# Patient Record
Sex: FEMALE | Race: BLACK OR AFRICAN AMERICAN | NOT HISPANIC OR LATINO | URBAN - METROPOLITAN AREA
[De-identification: names, ages, dates, MRNs, and addresses within clinical notes are randomized per-mention and may not be internally consistent; named-entity substitution may affect disease eponyms.]

---

## 2023-04-09 ENCOUNTER — HOSPITAL ENCOUNTER (EMERGENCY)
Facility: OTHER | Age: 48
Discharge: HOME | End: 2023-04-09
Attending: EMERGENCY MEDICINE

## 2023-04-09 VITALS
WEIGHT: 160 LBS | TEMPERATURE: 99 F | HEART RATE: 79 BPM | DIASTOLIC BLOOD PRESSURE: 72 MMHG | BODY MASS INDEX: 25.11 KG/M2 | HEIGHT: 67 IN | OXYGEN SATURATION: 100 % | RESPIRATION RATE: 17 BRPM | SYSTOLIC BLOOD PRESSURE: 122 MMHG

## 2023-04-09 DIAGNOSIS — Z11.1 SCREENING-PULMONARY TB: ICD-10-CM

## 2023-04-09 DIAGNOSIS — Z11.1 NORMAL SCREENING CHEST X-RAY FOR TUBERCULOSIS: ICD-10-CM

## 2023-04-09 DIAGNOSIS — Z20.822 LAB TEST NEGATIVE FOR COVID-19 VIRUS: Primary | ICD-10-CM

## 2023-04-09 LAB
CTP QC/QA: YES
SARS-COV-2 RDRP RESP QL NAA+PROBE: NEGATIVE

## 2023-04-09 PROCEDURE — 99283 EMERGENCY DEPT VISIT LOW MDM: CPT | Mod: 25

## 2023-04-09 NOTE — Clinical Note
"Angeles Orourkesam Rolle was seen and treated in our emergency department on 4/9/2023.  She may return to work on 04/09/2023.       If you have any questions or concerns, please don't hesitate to call.      PEPE Emanuel"
23

## 2023-04-10 NOTE — ED NOTES
Pt has the results of her Covid test that was recently done earlier today but does not have the chest x-ray.

## 2023-04-10 NOTE — ED PROVIDER NOTES
"  Source of History:  Patient     Chief complaint:  SHELTER  (shelter clearance needed )      HPI:  Angeles Rolle is a 48 y.o. female presenting with  request for screening for shelter placement.  Patient was told she needs screening for tuberculosis.  States that she would negative COVID swab earlier today.  Denies any symptomology for either.      This is the extent to the patients complaints today here in the emergency department.    ROS: As per HPI and below:  Constitutional: No fever.  No chills.  Eyes: No visual changes.   ENT: No sore throat. No ear pain.  Urinary: No abnormal urination.  MSK: no arthralgias  Integument: No rashes or lesions.    Review of patient's allergies indicates:  No Known Allergies    PMH:  As per HPI and below:  No past medical history on file.  No past surgical history on file.         Physical Exam:    /72 (BP Location: Left arm, Patient Position: Sitting)   Pulse 79   Temp 98.6 °F (37 °C) (Oral)   Resp 17   Ht 5' 7" (1.702 m)   Wt 72.6 kg (160 lb)   SpO2 100%   BMI 25.06 kg/m²   Nursing note and vital signs reviewed.  Constitutional: No acute distress.  Eyes: No conjunctival injection.  Extraocular muscles are intact.  ENT: Normal phonation.  Musculoskeletal:  Fair range of motion of extremities  Skin: No rashes seen.  Good turgor.  No abrasions.  No ecchymoses.  Psych: Appropriate, conversant.        I decided to obtain the patient's medical records.    Results for orders placed or performed during the hospital encounter of 04/09/23   POCT COVID-19 Rapid Screening   Result Value Ref Range    POC Rapid COVID Negative Negative     Acceptable Yes      Imaging Results              X-Ray Chest 1 View (Final result)  Result time 04/09/23 19:27:46   Procedure changed from X-Ray Chest AP Portable     Final result by Patrick Sandhu MD (04/09/23 19:27:46)                   Impression:      No acute cardiopulmonary process identified.      Electronically signed " by: Patrick Sandhu MD  Date:    04/09/2023  Time:    19:27               Narrative:    EXAMINATION:  XR CHEST 1 VIEW    CLINICAL HISTORY:  screening TB ; Encounter for screening for respiratory tuberculosis    TECHNIQUE:  Single frontal view of the chest was performed.    COMPARISON:  None    FINDINGS:  Cardiac silhouette is normal in size.  Lungs are symmetrically expanded.  No evidence of focal consolidative process, pneumothorax, or significant pleural effusion.  No acute osseous abnormality identified.                                      MDM:    48 y.o. female with request for screening for shelter placement.  Patient denies any symptomology for COVID or tuberculosis.  Denies any known exposure.  Exam grossly unremarkable.      Rapid COVID is obtain chest x-ray obtained.  Rapid COVID is negative.  Chest x-ray unremarkable.  Vitals remained stable.  She will be provided with these results for placement and was encouraged to establish care with PCP for further evaluation and continued monitoring of TB in any symptoms related to this. Strict instructions to follow up with primary care physician or reference provided for further assessment and evaluation. Given instructions to return for any acute symptoms and verbalized understanding of this medical plan.         Diagnostic Impression:    1. Lab test negative for COVID-19 virus    2. Screening-pulmonary TB    3. Normal screening chest x-ray for tuberculosis         ED Disposition Condition    Discharge Stable            ED Prescriptions    None       Follow-up Information       Follow up With Specialties Details Why Contact Info    [facility]  Schedule an appointment as soon as possible for a visit   [address] [facility] [address]  [phone]              Please pardon typos or dictation errors, as this note was transcribed using M*Modal fluency direct dictation software.      PEPE Emanuel  04/09/23 2018

## 2023-04-10 NOTE — ED NOTES
Angeles Rolle, an 48 y.o. female presents to the ED for a shelter clearance to live. Pt denies any medical complaints. Pt axo4       Chief Complaint   Patient presents with    SHELTER      shelter clearance needed      Review of patient's allergies indicates:  No Known Allergies  No past medical history on file.    LOC: Patient name and date of birth verified.  The patient is awake, alert and aware of environment with an appropriate affect, the patient is oriented x 3 and speaking appropriately.  Pt in NAD.    APPEARANCE: Patient resting comfortably and in no acute distress, patient is clean and well groomed, patient's clothing is properly fastened.  SKIN: The skin is warm and dry, color consistent with ethnicity, patient has normal skin turgor and moist mucus membranes, skin intact, no breakdown or brusing noted.  MUSCULOSKELETAL: Patient moving all extremities well, no obvious swelling or deformities noted.  RESPIRATORY: Airway is open and patent, respirations are spontaneous, patient has a normal effort and rate, no accessory muscle use noted.  CARDIAC: Patient has a normal rate and rhythm, no periphreal edema noted, capillary refill < 3 seconds.  ABDOMEN: No distention noted. Bowel sounds present in all four quadrants.   NEUROLOGIC: Eyes open spontaneously, behavior appropriate to situation, follows commands, facial expression symmetrical, bilateral hand grasp equal and even, purposeful motor response noted, normal sensation in all extremities when touched.